# Patient Record
Sex: FEMALE | HISPANIC OR LATINO | Employment: UNEMPLOYED | URBAN - METROPOLITAN AREA
[De-identification: names, ages, dates, MRNs, and addresses within clinical notes are randomized per-mention and may not be internally consistent; named-entity substitution may affect disease eponyms.]

---
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
## 2023-01-19 ENCOUNTER — POSTPARTUM VISIT (OUTPATIENT)
Dept: OBSTETRICS AND GYNECOLOGY | Age: 18
End: 2023-01-19
Payer: COMMERCIAL

## 2023-01-19 VITALS
HEIGHT: 61 IN | BODY MASS INDEX: 32.21 KG/M2 | SYSTOLIC BLOOD PRESSURE: 118 MMHG | DIASTOLIC BLOOD PRESSURE: 70 MMHG | WEIGHT: 170.6 LBS

## 2023-01-19 DIAGNOSIS — Z30.017 NEXPLANON INSERTION: Primary | ICD-10-CM

## 2023-01-19 PROBLEM — Z34.03 ENCOUNTER FOR SUPERVISION OF NORMAL FIRST PREGNANCY IN THIRD TRIMESTER: Status: RESOLVED | Noted: 2022-11-09 | Resolved: 2023-01-19

## 2023-01-19 PROBLEM — Z13.89 SCREENING FOR BLOOD OR PROTEIN IN URINE: Status: RESOLVED | Noted: 2022-11-23 | Resolved: 2023-01-19

## 2023-01-19 PROBLEM — D50.9 IRON DEFICIENCY ANEMIA DURING PREGNANCY: Status: RESOLVED | Noted: 2022-10-26 | Resolved: 2023-01-19

## 2023-01-19 PROBLEM — Z34.00 SUPERVISION OF NORMAL FIRST PREGNANCY, ANTEPARTUM: Status: RESOLVED | Noted: 2022-08-24 | Resolved: 2023-01-19

## 2023-01-19 PROBLEM — Z34.90 PREGNANCY: Status: RESOLVED | Noted: 2022-09-29 | Resolved: 2023-01-19

## 2023-01-19 PROBLEM — O99.019 IRON DEFICIENCY ANEMIA DURING PREGNANCY: Status: RESOLVED | Noted: 2022-10-26 | Resolved: 2023-01-19

## 2023-01-19 PROBLEM — R07.9 CHEST PAIN, UNSPECIFIED TYPE: Status: RESOLVED | Noted: 2022-09-28 | Resolved: 2023-01-19

## 2023-01-19 PROCEDURE — 0503F POSTPARTUM CARE VISIT: CPT | Performed by: OBSTETRICS & GYNECOLOGY

## 2023-01-19 PROCEDURE — 11981 INSERTION DRUG DLVR IMPLANT: CPT | Performed by: OBSTETRICS & GYNECOLOGY

## 2023-01-19 NOTE — PROGRESS NOTES
PROCEDURE NOTE   Nexplanon Insertion    Applying Universal Precautions I properly identified the patient and sought her signature with informed consent.This patient was counseled on the benefits and risks of insertion.    No LMP recorded.   UCG  not done  GENPROBE not done  Previous contraception used included  none    Site  left arm   The area was prepped with Betadine,  3 cc's of  local 1% xylocaine with epinephrine was injected sub-cutaneous with a 25 ga needle.  After sufficient time for the anesthetic to work the device was inserted nexplanonsite: approximately 8 cm from the medial epicondyle and 3 cm posterior to the medial sulcus.  Closure and dressing was completed by gauze and stretchwrap..  Instructions for wound care and follow up were discussed.    The patient tolerated the procedure well.    Lois Guan MD  1/19/2023  14:05 EST

## 2023-01-19 NOTE — PROGRESS NOTES
"Chief Complaint   Patient presents with   • Postpartum Follow-up     6 week PP Check, , baby boy, 9lbs 9.1oz, breast feeding     Serena Caraballo is doing well, six wks postop from  for macrosomia.    Notes some issues occasionally with clogged ducts but no true mastitis.  Advised to call for antibiotics if she truly has any mastitis  No bleeding issues  No mood complaints  Desires Nexplanon    /70   Ht 154.9 cm (61\")   Wt 77.4 kg (170 lb 9.6 oz)   Breastfeeding Yes   BMI 32.23 kg/m²   Well, no distress  Regular, nonlabored breathing  Fundus is not palpable  Incision is clean/dry/intact  No pedal edema, no calf tenderness noted    A/P  Postpartum-doing well  Nexplanon placed today    Follow-up next year for annual exam with Pap    Lois Guan MD  2023  14:04 EST      "

## 2023-06-05 ENCOUNTER — HOSPITAL ENCOUNTER (EMERGENCY)
Facility: HOSPITAL | Age: 18
Discharge: HOME | End: 2023-06-05
Attending: EMERGENCY MEDICINE | Admitting: EMERGENCY MEDICINE
Payer: COMMERCIAL

## 2023-06-05 VITALS
DIASTOLIC BLOOD PRESSURE: 82 MMHG | TEMPERATURE: 98 F | RESPIRATION RATE: 18 BRPM | BODY MASS INDEX: 32.1 KG/M2 | SYSTOLIC BLOOD PRESSURE: 118 MMHG | WEIGHT: 170 LBS | HEART RATE: 99 BPM | OXYGEN SATURATION: 99 % | HEIGHT: 61 IN

## 2023-06-05 DIAGNOSIS — L72.3 INFECTED SEBACEOUS CYST: Primary | ICD-10-CM

## 2023-06-05 DIAGNOSIS — L08.9 INFECTED SEBACEOUS CYST: Primary | ICD-10-CM

## 2023-06-05 PROCEDURE — 99283 EMERGENCY DEPT VISIT LOW MDM: CPT

## 2023-06-05 RX ORDER — AMOXICILLIN AND CLAVULANATE POTASSIUM 875; 125 MG/1; MG/1
1 TABLET, FILM COATED ORAL ONCE
Status: COMPLETED | OUTPATIENT
Start: 2023-06-05 | End: 2023-06-05

## 2023-06-05 RX ORDER — SULFAMETHOXAZOLE AND TRIMETHOPRIM 800; 160 MG/1; MG/1
1 TABLET ORAL ONCE
Status: DISCONTINUED | OUTPATIENT
Start: 2023-06-05 | End: 2023-06-05

## 2023-06-05 RX ORDER — AMOXICILLIN AND CLAVULANATE POTASSIUM 875; 125 MG/1; MG/1
1 TABLET, FILM COATED ORAL EVERY 12 HOURS
Qty: 14 TABLET | Refills: 0 | Status: SHIPPED | OUTPATIENT
Start: 2023-06-05

## 2023-06-05 RX ADMIN — AMOXICILLIN AND CLAVULANATE POTASSIUM 1 TABLET: 875; 125 TABLET, FILM COATED ORAL at 22:34

## 2023-06-06 NOTE — ED PROVIDER NOTES
MD ATTESTATION NOTE    The [facility] and I have discussed this patient's history, physical exam, and treatment plan.  I have reviewed the documentation and personally had a face to face interaction with the patient. I affirm the documentation and agree with the treatment and plan.  The attached note describes my personal findings.      I provided a substantive portion of the care of the patient.  I personally performed the physical exam in its entirety, and below are my findings.  For this patient encounter, the patient wore surgical mask, I wore full protective PPE including N95 and eye protection.      Brief HPI: Patient presents for evaluation of abscess to right scalp.  Patient has had cyst there for several months.  Has gotten progressively worse.  Patient has had increasing pain.  Has had some drainage.    PHYSICAL EXAM  ED Triage Vitals [06/05/23 2131]   Temp Heart Rate Resp BP SpO2   98 °F (36.7 °C) 99 18 118/82 99 %      Temp src Heart Rate Source Patient Position BP Location FiO2 (%)   -- -- -- -- --         GENERAL:no acute distress  HENT: nares patent  EYES: no scleral icterus  RESPIRATORY: normal effort  MUSCULOSKELETAL: no deformity  NEURO: alert, moves all extremities, follows commands  PSYCH:  calm, cooperative  SKIN: warm, dry.  Abscess to right scalp    Vital signs and nursing notes reviewed.        Plan: Surgical referral       Nathan So MD  06/05/23 7450

## 2023-06-06 NOTE — DISCHARGE INSTRUCTIONS
Home, rest, apply warm compresses, medicine as directed.  Follow-up with surgeon for removal of the sebaceous cyst.  Home medicine as prescribed, follow up with PCP as needed. Return to care with further concerns.

## 2023-06-06 NOTE — ED NOTES
Pt to ED via PV. Pt c/o abscess to right side of head that started today. Pt reports oozing today and pain. Pt took tylenol without relief.

## 2023-06-06 NOTE — ED PROVIDER NOTES
EMERGENCY DEPARTMENT ENCOUNTER    Room Number:  T03/03  Date of encounter:  2023  PCP: Provider, No Known  Patient Care Team:  Provider, No Known as PCP - Lois Mace MD as Obstetrician (Obstetrics and Gynecology)   Independent Historians: Patient    HPI:  Chief Complaint: Abscess  A complete HPI/ROS/PMH/PSH/SH/FH are unobtainable due to: N/A    Chronic or social conditions impacting patient care (social determinants of health): None    Context: Serena Caraballo is a 18 y.o. female with no past medical history who arrives to the ED with complaint of abscess in the scalp.  Patient states that she has had a cyst since at least February, that over the past 2 weeks has become increasingly swollen and painful and today started to have a small amount of drainage.  States that the area is very sore, denies any recent injury or trauma, no fever or chills, or any other complaints.    Review of prior external notes (non-ED): None    Review of prior external test results outside of this encounter: None    PAST MEDICAL HISTORY  Active Ambulatory Problems     Diagnosis Date Noted    Tachycardia 2022    Macrosomia of fetus affecting management of mother in third trimester, single or unspecified fetus 10/26/2022     delivery delivered 2022     Resolved Ambulatory Problems     Diagnosis Date Noted    Supervision of normal first pregnancy, antepartum 2022    Chest pain, unspecified type 2022    Pregnancy 2022    Chest pain 2022    Iron deficiency anemia during pregnancy 10/26/2022    Encounter for supervision of normal first pregnancy in third trimester 2022    Screening for blood or protein in urine 2022    Macrosomia 2022     No Additional Past Medical History       The patient has started, but not completed, their COVID-19 vaccination series.    PAST SURGICAL HISTORY  Past Surgical History:   Procedure Laterality Date     SECTION N/A 2022     Procedure:  SECTION PRIMARY;  Surgeon: Lois Guan MD;  Location: [facility] LABOR DELIVERY;  Service: Obstetrics/Gynecology;  Laterality: N/A;    NO PAST SURGERIES           FAMILY HISTORY  Family History   Problem Relation Age of Onset    Hypertension Mother     Diabetes Mother     Ovarian cancer Maternal Aunt     Pancreatic cancer Maternal Aunt     Ovarian cancer Maternal Aunt     Breast cancer Neg Hx     Uterine cancer Neg Hx     Colon cancer Neg Hx     Congenital heart disease Neg Hx          SOCIAL HISTORY  Social History     Socioeconomic History    Marital status: Single   Tobacco Use    Smoking status: Former     Types: Electronic Cigarette     Quit date: 2022     Years since quittin.2   Vaping Use    Vaping Use: Former    Quit date: 3/22/2022   Substance and Sexual Activity    Alcohol use: Never    Drug use: Never    Sexual activity: Yes     Partners: Male     Birth control/protection: Condom         ALLERGIES  Patient has no known allergies.        REVIEW OF SYSTEMS  Review of Systems     All systems reviewed and negative except for those discussed in HPI.       PHYSICAL EXAM    I have reviewed the triage vital signs and nursing notes.    ED Triage Vitals [23 2131]   Temp Heart Rate Resp BP SpO2   98 °F (36.7 °C) 99 18 118/82 99 %      Temp src Heart Rate Source Patient Position BP Location FiO2 (%)   -- -- -- -- --       Physical Exam    GENERAL: alert and oriented x4, not distressed  HENT: normocephalic, atraumatic, no cervical lymphadenopathy  EYES: no scleral icterus, PERRL, EOMI  CV: regular rhythm, regular rate  RESPIRATORY: normal effort, CTAB  MUSCULOSKELETAL: no deformity  NEURO: alert, moves all extremities, no focal neuro deficits, follows commands  SKIN: warm, dry, no rash, patient has an approximately 2 x 2 centimeter sebaceous cyst to the right side of scalp with surrounding inflammation, erythema, and a small amount of purulent drainage  Psych: Appropriate mood and  affect      Nursing notes and vital signs reviewed      LAB RESULTS  No results found for this or any previous visit (from the past 24 hour(s)).    Ordered the above labs and independently reviewed and interpreted the results by me.        RADIOLOGY  No Radiology Exams Resulted Within Past 24 Hours    I ordered the above noted radiological studies.  These were independently interpreted and reviewed by me.  See dictation for official radiology interpretation.      PROCEDURES    Procedures      MEDICATIONS GIVEN IN ER    Medications   sulfamethoxazole-trimethoprim (BACTRIM DS,SEPTRA DS) 800-160 MG per tablet 1 tablet (has no administration in time range)   amoxicillin-clavulanate (AUGMENTIN) 875-125 MG per tablet 1 tablet (has no administration in time range)         PROGRESS, DATA ANALYSIS, CONSULTS, AND MEDICAL DECISION MAKING    All labs have been independently reviewed by me.  All radiology studies have been reviewed by me and discussed with radiologist dictating the report.   EKG's independently viewed and interpreted by me.  Discussion below represents my analysis of pertinent findings related to patient's condition, differential diagnosis, treatment plan and final disposition.    DDx:  Includes, but is not limited to sebaceous cyst, abscess, cellulitis           MDM: After shared decision making discussion patient opted to treat with local therapy and antibiotics and follow-up with surgery for definitive treatment and removal of the entire cyst versus an incision and drainage.  Discussed strict return to ER precautions including fever, chills, worsening pain or size of the abscess.  Patient expresses understanding and agrees with plan.    PPE:  The patient wore a mask and I wore a mask and all appropriate PPE throughout the entire patient encounter.      AS OF 22:27 EDT VITALS:    BP - 118/82  HR - 99  TEMP - 98 °F (36.7 °C)  O2 SATS - 99%      DIAGNOSIS  Final diagnoses:   Infected sebaceous cyst          DISPOSITION  DISCHARGE    Patient discharged in stable condition.    Reviewed implications of results, diagnosis, meds, responsibility to follow up, warning signs and symptoms of possible worsening, potential complications and reasons to return to ER.    Patient/Family voiced understanding of above instructions.    Discussed plan for discharge, as there is no emergent indication for admission. Patient referred to primary care provider for BP management due to today's BP. Pt/family is agreeable and understands need for follow up and repeat testing.  Pt is aware that discharge does not mean that nothing is wrong but it indicates no emergency is present that requires admission and they must continue care with follow-up as given below or physician of their choice.     FOLLOW-UP  Yesenia Woodall MD  [address]  [facility] [id number] [phone]    Schedule an appointment as soon as possible for a visit in 3 days  For wound re-check         Medication List        New Prescriptions      amoxicillin-clavulanate 875-125 MG per tablet  Commonly known as: AUGMENTIN  Take 1 tablet by mouth Every 12 (Twelve) Hours.               Where to Get Your Medications        These medications were sent to [id number] - [place], [place] - [address] AT [facility] [address] [facility] [phone] [facility] [phone]   [address] , [facility] [address]      Phone: [phone]   amoxicillin-clavulanate 875-125 MG per tablet           Note Disclaimer: At [facility], we believe that sharing information builds trust and better relationships. You are receiving this note because you recently visited [facility]. It is possible you will see health information before a provider has talked with you about it. This kind of information can be easy to misunderstand. To help you fully understand what it means for your health, we urge you to discuss this note with your provider.        Jose Alfredo Garcia, PA  06/05/23 8977

## 2023-06-07 ENCOUNTER — OFFICE VISIT (OUTPATIENT)
Dept: SURGERY | Facility: CLINIC | Age: 18
End: 2023-06-07
Payer: COMMERCIAL

## 2023-06-07 VITALS — HEIGHT: 61 IN | BODY MASS INDEX: 31.34 KG/M2 | WEIGHT: 166 LBS

## 2023-06-07 DIAGNOSIS — L72.3 SEBACEOUS CYST: Primary | ICD-10-CM

## 2024-02-28 ENCOUNTER — OFFICE VISIT (OUTPATIENT)
Dept: OBSTETRICS AND GYNECOLOGY | Age: 19
End: 2024-02-28
Payer: COMMERCIAL

## 2024-02-28 VITALS
WEIGHT: 163 LBS | DIASTOLIC BLOOD PRESSURE: 72 MMHG | HEIGHT: 61 IN | BODY MASS INDEX: 30.78 KG/M2 | SYSTOLIC BLOOD PRESSURE: 112 MMHG

## 2024-02-28 DIAGNOSIS — Z01.419 WELL FEMALE EXAM WITH ROUTINE GYNECOLOGICAL EXAM: Primary | ICD-10-CM

## 2024-02-28 DIAGNOSIS — Z11.3 SCREEN FOR STD (SEXUALLY TRANSMITTED DISEASE): ICD-10-CM

## 2024-02-28 DIAGNOSIS — Z11.51 SCREENING FOR HUMAN PAPILLOMAVIRUS (HPV): ICD-10-CM

## 2024-02-28 DIAGNOSIS — Z12.4 SCREENING FOR MALIGNANT NEOPLASM OF CERVIX: ICD-10-CM

## 2024-02-28 NOTE — PROGRESS NOTES
Subjective     Chief Complaint   Patient presents with    Gynecologic Exam     Annual exam, first pap today, no complaints       History of Present Illness    Serena Caraballo is a 19 y.o.  very pleasant patient who presents for annual exam.  Her menses are absent with nexplanon  Baby is 1 years old doing well   Still breastfeeding  Works at a   No vaginal or urinary concerns    Obstetric History:  OB History          1    Para   1    Term   1            AB        Living   1         SAB        IAB        Ectopic        Molar        Multiple   0    Live Births   1               Menstrual History:     No LMP recorded (lmp unknown). Patient has had an implant.         Current contraception: Nexplanon  History of abnormal Pap smear: no  Received Gardasil immunization: yes  Perform regular self breast exam: no  Family history of uterine or ovarian cancer: yes - aunt on maternal side  Family History of colon cancer: no  Family history of breast cancer: no    Mammogram: not indicated.  Colonoscopy: not indicated.  DEXA: not indicated.    Exercise: moderately active  Calcium/Vitamin D: adequate intake and uses supplements    The following portions of the patient's history were reviewed and updated as appropriate: allergies, current medications, past family history, past medical history, past social history, past surgical history, and problem list.    Review of Systems   Constitutional: Negative.    HENT: Negative.     Eyes: Negative.    Respiratory: Negative.     Cardiovascular: Negative.    Gastrointestinal: Negative.    Endocrine: Negative.    Genitourinary: Negative.    Musculoskeletal: Negative.    Skin: Negative.    Allergic/Immunologic: Negative.    Neurological: Negative.    Hematological: Negative.    Psychiatric/Behavioral: Negative.             Objective   Physical Exam  Vitals and nursing note reviewed.   Constitutional:       Appearance: Normal appearance.   HENT:      Head: Normocephalic.  "  Eyes:      Pupils: Pupils are equal, round, and reactive to light.   Cardiovascular:      Rate and Rhythm: Normal rate and regular rhythm.      Pulses: Normal pulses.      Heart sounds: Normal heart sounds.   Pulmonary:      Effort: Pulmonary effort is normal.      Breath sounds: Normal breath sounds.   Chest:   Breasts:     Right: Normal.      Left: Normal.   Abdominal:      General: Abdomen is flat. Bowel sounds are normal.      Palpations: Abdomen is soft.   Genitourinary:     General: Normal vulva.      Exam position: Lithotomy position.      Vagina: Normal.      Cervix: Normal.      Uterus: Normal.       Adnexa: Right adnexa normal and left adnexa normal.      Rectum: Normal.   Musculoskeletal:         General: Normal range of motion.      Cervical back: Full passive range of motion without pain and normal range of motion.      Right lower leg: No edema.      Left lower leg: No edema.   Lymphadenopathy:      Head:      Right side of head: No submental or submandibular adenopathy.      Left side of head: No submental or submandibular adenopathy.   Skin:     General: Skin is warm and dry.   Neurological:      General: No focal deficit present.      Mental Status: She is alert.      Gait: Gait is intact.   Psychiatric:         Attention and Perception: Attention normal.         Mood and Affect: Mood normal.         Speech: Speech normal.         /72   Ht 154.9 cm (61\")   Wt 73.9 kg (163 lb)   LMP  (LMP Unknown)   Breastfeeding No   BMI 30.80 kg/m²     Assessment & Plan   Diagnoses and all orders for this visit:    1. Well female exam with routine gynecological exam (Primary)    2. Screening for human papillomavirus (HPV)  -     IGP, Rfx Aptima HPV ASCU    3. Screening for malignant neoplasm of cervix  -     IGP, Rfx Aptima HPV ASCU    4. Screen for STD (sexually transmitted disease)  -     NuSwab VG+ - Swab, Vagina      Pap today guidelines reviewed  Nuswab to r/o infection  All questions " answered.  Breast self exam technique reviewed and patient encouraged to perform self-exam monthly.  Discussed healthy lifestyle modifications.  Recommended 30 minutes of aerobic exercise five times per week.

## 2024-03-01 LAB
A VAGINAE DNA VAG QL NAA+PROBE: NORMAL SCORE
BVAB2 DNA VAG QL NAA+PROBE: NORMAL SCORE
C ALBICANS DNA VAG QL NAA+PROBE: NEGATIVE
C GLABRATA DNA VAG QL NAA+PROBE: NEGATIVE
C TRACH DNA VAG QL NAA+PROBE: NEGATIVE
MEGA1 DNA VAG QL NAA+PROBE: NORMAL SCORE
N GONORRHOEA DNA VAG QL NAA+PROBE: NEGATIVE
T VAGINALIS DNA VAG QL NAA+PROBE: NEGATIVE

## 2024-03-04 LAB
CONV .: NORMAL
CYTOLOGIST CVX/VAG CYTO: NORMAL
CYTOLOGY CVX/VAG DOC CYTO: NORMAL
CYTOLOGY CVX/VAG DOC THIN PREP: NORMAL
DX ICD CODE: NORMAL
Lab: NORMAL
OTHER STN SPEC: NORMAL
STAT OF ADQ CVX/VAG CYTO-IMP: NORMAL

## 2024-05-21 ENCOUNTER — TELEPHONE (OUTPATIENT)
Dept: OBSTETRICS AND GYNECOLOGY | Age: 19
End: 2024-05-21
Payer: COMMERCIAL

## 2024-05-21 NOTE — TELEPHONE ENCOUNTER
Pt had Nexplanon placed 1-2023. Pt c/o shooting pain for a couple days in her arm. 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 Pt does not think she did anything to hurt her arm. Pt can feel Nexplanon in her arm. Pt has not tried anything for the pain, advised to try Ibuprofen. Pt wants to know what to do

## (undated) DEVICE — SUT VIC PLS 0 CTX 36IN UD VCP978H

## (undated) DEVICE — SOL IRR H2O BTL 1000ML STRL

## (undated) DEVICE — SUT MNCRYL 0/0 CTX 36IN Y398H

## (undated) DEVICE — SLV SCD CALF HEMOFORCE DVT THERP REPROC MD

## (undated) DEVICE — Device: Brand: PORTEX

## (undated) DEVICE — SOL IRR NACL 0.9PCT BT 1000ML

## (undated) DEVICE — SUT MNCRYL 3/0 KS 27IN UD MCP523H

## (undated) DEVICE — GLV SURG BIOGEL LTX PF 6 1/2

## (undated) DEVICE — 3M(TM) TEGADERM(TM) TRANSPARENT FILM DRESSING FRAME STYLE 1627: Brand: 3M™ TEGADERM™

## (undated) DEVICE — ANTIBACTERIAL UNDYED BRAIDED (POLYGLACTIN 910), SYNTHETIC ABSORBABLE SUTURE: Brand: COATED VICRYL